# Patient Record
(demographics unavailable — no encounter records)

---

## 2024-11-07 NOTE — PHYSICAL EXAM
[Alert] : alert [Well Nourished] : well nourished [No Acute Distress] : no acute distress [EOMI] : extra ocular movement intact [Normal Hearing] : hearing was normal [No Respiratory Distress] : no respiratory distress [Normal Rate] : heart rate was normal [No CVA Tenderness] : no ~M costovertebral angle tenderness [Right foot was examined, including] : right foot ~C was examined, including visual inspection with sensory and pulse exams [Left foot was examined, including] : left foot ~C was examined, including visual inspection with sensory and pulse exams [No Tremors] : no tremors [Oriented x3] : oriented to person, place, and time [Normal Affect] : the affect was normal [Normal Mood] : the mood was normal [de-identified] : +Marcelinoma

## 2024-11-07 NOTE — HISTORY OF PRESENT ILLNESS
[FreeTextEntry1] : Mr. Talbert is presenting for RUTHIE and hypothyroidism.   61 year old male with PMH of RUTHIE since ~2018, arthritis, HLD, HTN, Hypothyroidism, Crohn's disease.   #Type 2 Diabetes  On insulin since 1995   Reports no microvascular complications, no history of retinopathy, nephropathy or neuropathy.  Reports no history of cardiovascular risk factors, no history of CAD, CHF or CVA in the past.   Current DM meds: Humalog U-200 5-6 units to 15 to 20 units but takes it after eating, Tresiba 30 to 35 units HS.  Prior DM meds:  Other pertinent meds:   POCT A1c%: 7.6% August 2022 --> 7.6% Sept 2023 --> 6.5% Nov 2024  POCT BG:   FSG: Madisyn 3 - Uses reader Had allergic reaction/infection at arm sight.  Oct 19 to Nov 1, 2024  Very high 11%  High 22%  Target range 59%  Low 6%  Very low 2%    Diet:  Breakfast: Muffins (store bought) or bagel or cereal  Lunch: Chips, mixed nuts, raisins.  Dinner: Store brought (prepared sandwich's on roll or salmon croquettes or shrimp cakes or quiche). Wings on occasion.  Snacks: Fruit in summer pre-cut. No regular soda or juice.   Exercise: Very active, pickle ball etc for 2 hours.  Urine micro: Sept 2023 wnl ACE: C-peptide:  Cr: 0.99 GFR: 87  Lipid profile: , TGs 107 Sept 2023 Statin: Needs  HbA1c%: 6.5%  Ophthalmology:  Neuropathy:  Podiatry/Diabetic foot exam:   #Graves s/p THORNE, Now Hypothyroidism.  Pt now on Levothyroxine 75mcg QD  Sept 2023 TSH 0.96

## 2024-11-07 NOTE — REASON FOR VISIT
[Follow - Up] : a follow-up visit [DM Type 2] : DM Type 2 [Hypothyroidism] : hypothyroidism [DM Type 1] : DM Type 1

## 2024-11-07 NOTE — ASSESSMENT
[Diabetes Foot Care] : diabetes foot care [Long Term Vascular Complications] : long term vascular complications of diabetes [Carbohydrate Consistent Diet] : carbohydrate consistent diet [Importance of Diet and Exercise] : importance of diet and exercise to improve glycemic control, achieve weight loss and improve cardiovascular health [Exercise/Effect on Glucose] : exercise/effect on glucose [Hypoglycemia Management] : hypoglycemia management [Self Monitoring of Blood Glucose] : self monitoring of blood glucose [Injection Technique, Storage, Sharps Disposal] : injection technique, storage, and sharps disposal [Retinopathy Screening] : Patient was referred to ophthalmology for retinopathy screening [FreeTextEntry1] : 61 year old male with PMH of Type 2 Diabetes since ~2018, arthritis, HLD, HTN, Hypothyroidism, Crohn's disease is presenting for Type 2 Diabetes and hypothyroidism.   1. Type 2 diabetes mellitus. HbA1c 7.6% August 2022 --> 7.6% Sept 2023 --> 6.5% Oct 2024  -We discussed the cardiovascular and microvascular complications of uncontrolled diabetes. We discussed the importance of diet and exercise and lifestyle modification for glycemic control and weight loss. We discussed referral to our diabetes educator and nutrition. We discussed pharmacologic options for glycemic control and weight loss.  -Continue Humalog U-200 5-6 units to 15 to 20 units but 5-15 minutes BEFORE eating, Tresiba 30 to 35 units HS. -Urine ACR UTD  -LDL above goal, repeat and discuss statin  -Opthal and Foot care discussed  -Continue Madisyn 3, advised to apply benadryl cream and HC cream prior to insertion. Patient has sensitive and reactive skin.   2. Hypothyroidism  TSH normal in 2023  C/w current dose of LT4  Repeat now, script given   Patient verbalized understanding of the above. All questions were answered to patient's satisfaction. Dispo: Patient will follow up in 5 months.

## 2024-11-07 NOTE — PHYSICAL EXAM
[Alert] : alert [Well Nourished] : well nourished [No Acute Distress] : no acute distress [EOMI] : extra ocular movement intact [Normal Hearing] : hearing was normal [No Respiratory Distress] : no respiratory distress [Normal Rate] : heart rate was normal [No CVA Tenderness] : no ~M costovertebral angle tenderness [Right foot was examined, including] : right foot ~C was examined, including visual inspection with sensory and pulse exams [Left foot was examined, including] : left foot ~C was examined, including visual inspection with sensory and pulse exams [No Tremors] : no tremors [Oriented x3] : oriented to person, place, and time [Normal Affect] : the affect was normal [Normal Mood] : the mood was normal [de-identified] : +Marcelinoma

## 2024-11-07 NOTE — PHYSICAL EXAM
[Alert] : alert [Well Nourished] : well nourished [No Acute Distress] : no acute distress [EOMI] : extra ocular movement intact [Normal Hearing] : hearing was normal [No Respiratory Distress] : no respiratory distress [Normal Rate] : heart rate was normal [No CVA Tenderness] : no ~M costovertebral angle tenderness [Right foot was examined, including] : right foot ~C was examined, including visual inspection with sensory and pulse exams [Left foot was examined, including] : left foot ~C was examined, including visual inspection with sensory and pulse exams [No Tremors] : no tremors [Oriented x3] : oriented to person, place, and time [Normal Affect] : the affect was normal [Normal Mood] : the mood was normal [de-identified] : +Marcelinoma

## 2024-11-11 NOTE — PLAN
[FreeTextEntry1] : continue medications Balsalazide Levothyroxine Lisinopril  chronic medical conditions HTN HLD DM Further instructions pending lab results  Follow up with endocrinologist and dermatologist  Flu vaccine to get in  near future  follow up END0 to see Orthopedist regarding knee  Pulmonary fo Snoring

## 2024-11-11 NOTE — HEALTH RISK ASSESSMENT
[Good] : ~his/her~  mood as  good [Yes] : Yes [Monthly or less (1 pt)] : Monthly or less (1 point) [1 or 2 (0 pts)] : 1 or 2 (0 points) [Never (0 pts)] : Never (0 points) [No] : In the past 12 months have you used drugs other than those required for medical reasons? No [No falls in past year] : Patient reported no falls in the past year [Little interest or pleasure doing things] : 1) Little interest or pleasure doing things [Feeling down, depressed, or hopeless] : 2) Feeling down, depressed, or hopeless [0] : 2) Feeling down, depressed, or hopeless: Not at all (0) [PHQ-2 Negative - No further assessment needed] : PHQ-2 Negative - No further assessment needed [Never] : Never [[id number]] : 0

## 2024-11-11 NOTE — HISTORY OF PRESENT ILLNESS
[FreeTextEntry1] : annual physical  [de-identified] : OLEG STEARNS is a 61-year-old M who presents today for his annual physical. Pt has a hx of HTN, HLD, DM, Crohn's colitis and hypothyroidism. Pt last had a colonoscopy about a year ago. Pt last saw his endocrinologist in 11/24. Pt states experiencing knee pain. Pt sees a dermatologist for eczema. Pt complains of right groin swelling. A1c 6.5 knne pain snoring

## 2024-11-11 NOTE — END OF VISIT
[FreeTextEntry3] : "I, Matty Strickland, personally scribed the services dictated to me by Dr. Perry Ferguson MD in this documentation on 11/11/2024"   "I Dr. Perry Ferguson MD, personally performed the services described in this documentation on 11/11/2024 for the patient as scribed by Matty Strickland in my presence. I have reviewed and verified that all the information is accurate and true."

## 2024-11-11 NOTE — HISTORY OF PRESENT ILLNESS
[FreeTextEntry1] : annual physical  [de-identified] : OLEG STEARNS is a 61-year-old M who presents today for his annual physical. Pt has a hx of HTN, HLD, DM, Crohn's colitis and hypothyroidism. Pt last had a colonoscopy about a year ago. Pt last saw his endocrinologist in 11/24. Pt states experiencing knee pain. Pt sees a dermatologist for eczema. Pt complains of right groin swelling. A1c 6.5 knne pain snoring

## 2024-11-11 NOTE — HEALTH RISK ASSESSMENT
[Good] : ~his/her~  mood as  good [Yes] : Yes [Monthly or less (1 pt)] : Monthly or less (1 point) [1 or 2 (0 pts)] : 1 or 2 (0 points) [Never (0 pts)] : Never (0 points) [No] : In the past 12 months have you used drugs other than those required for medical reasons? No [No falls in past year] : Patient reported no falls in the past year [Little interest or pleasure doing things] : 1) Little interest or pleasure doing things [Feeling down, depressed, or hopeless] : 2) Feeling down, depressed, or hopeless [0] : 2) Feeling down, depressed, or hopeless: Not at all (0) [PHQ-2 Negative - No further assessment needed] : PHQ-2 Negative - No further assessment needed [Never] : Never [[id number]] : 0

## 2024-11-11 NOTE — PHYSICAL EXAM
[No Acute Distress] : no acute distress [Well Nourished] : well nourished [Well Developed] : well developed [Well-Appearing] : well-appearing [Normal Voice/Communication] : normal voice/communication [Normal Sclera/Conjunctiva] : normal sclera/conjunctiva [PERRL] : pupils equal round and reactive to light [EOMI] : extraocular movements intact [Normal Outer Ear/Nose] : the outer ears and nose were normal in appearance [Normal Oropharynx] : the oropharynx was normal [Normal TMs] : both tympanic membranes were normal [No JVD] : no jugular venous distention [No Lymphadenopathy] : no lymphadenopathy [Supple] : supple [Thyroid Normal, No Nodules] : the thyroid was normal and there were no nodules present [No Respiratory Distress] : no respiratory distress  [No Accessory Muscle Use] : no accessory muscle use [Clear to Auscultation] : lungs were clear to auscultation bilaterally [Normal Rate] : normal rate  [Regular Rhythm] : with a regular rhythm [Normal S1, S2] : normal S1 and S2 [No Murmur] : no murmur heard [No Carotid Bruits] : no carotid bruits [No Abdominal Bruit] : a ~M bruit was not heard ~T in the abdomen [No Varicosities] : no varicosities [Pedal Pulses Present] : the pedal pulses are present [No Edema] : there was no peripheral edema [No Palpable Aorta] : no palpable aorta [No Extremity Clubbing/Cyanosis] : no extremity clubbing/cyanosis [Soft] : abdomen soft [Non Tender] : non-tender [Non-distended] : non-distended [No Masses] : no abdominal mass palpated [No HSM] : no HSM [Normal Bowel Sounds] : normal bowel sounds [Normal Supraclavicular Nodes] : no supraclavicular lymphadenopathy [Normal Posterior Cervical Nodes] : no posterior cervical lymphadenopathy [Normal Anterior Cervical Nodes] : no anterior cervical lymphadenopathy [No CVA Tenderness] : no CVA  tenderness [No Spinal Tenderness] : no spinal tenderness [No Joint Swelling] : no joint swelling [Grossly Normal Strength/Tone] : grossly normal strength/tone [No Rash] : no rash [Coordination Grossly Intact] : coordination grossly intact [No Focal Deficits] : no focal deficits [Normal Gait] : normal gait [Deep Tendon Reflexes (DTR)] : deep tendon reflexes were 2+ and symmetric [Speech Grossly Normal] : speech grossly normal [Memory Grossly Normal] : memory grossly normal [Normal Affect] : the affect was normal [Alert and Oriented x3] : oriented to person, place, and time [Normal Mood] : the mood was normal [Normal Insight/Judgement] : insight and judgment were intact [Normal Sphincter Tone] : normal sphincter tone [No Mass] : no mass [Penis Abnormality] : normal circumcised penis [Scrotum] : the scrotum was normal [Testes Tenderness] : no tenderness of the testes [Testes Mass (___cm)] : there were no testicular masses [Prostate Enlargement] : the prostate was not enlarged [Prostate Tenderness] : the prostate was not tender [No Prostate Nodules] : no prostate nodules [Normal Axillary Nodes] : no axillary lymphadenopathy [Normal Inguinal Nodes] : no inguinal lymphadenopathy [Normal Femoral Nodes] : no femoral lymphadenopathy